# Patient Record
Sex: FEMALE | NOT HISPANIC OR LATINO
[De-identification: names, ages, dates, MRNs, and addresses within clinical notes are randomized per-mention and may not be internally consistent; named-entity substitution may affect disease eponyms.]

---

## 2017-04-17 ENCOUNTER — APPOINTMENT (OUTPATIENT)
Dept: HEMATOLOGY ONCOLOGY | Facility: CLINIC | Age: 67
End: 2017-04-17

## 2017-04-17 VITALS
RESPIRATION RATE: 12 BRPM | SYSTOLIC BLOOD PRESSURE: 136 MMHG | WEIGHT: 132 LBS | HEART RATE: 65 BPM | OXYGEN SATURATION: 99 % | DIASTOLIC BLOOD PRESSURE: 89 MMHG | TEMPERATURE: 97.6 F

## 2017-04-18 LAB
ALBUMIN SERPL ELPH-MCNC: 4.6 G/DL
ALP BLD-CCNC: 64 U/L
ALT SERPL-CCNC: 13 U/L
ANION GAP SERPL CALC-SCNC: 17 MMOL/L
AST SERPL-CCNC: 18 U/L
BILIRUB SERPL-MCNC: 0.5 MG/DL
BUN SERPL-MCNC: 17 MG/DL
CALCIUM SERPL-MCNC: 9.6 MG/DL
CHLORIDE SERPL-SCNC: 101 MMOL/L
CO2 SERPL-SCNC: 26 MMOL/L
CREAT SERPL-MCNC: 0.76 MG/DL
GLUCOSE SERPL-MCNC: 99 MG/DL
LDH SERPL-CCNC: 191 U/L
POTASSIUM SERPL-SCNC: 4.8 MMOL/L
PROT SERPL-MCNC: 6.6 G/DL
SODIUM SERPL-SCNC: 144 MMOL/L
URATE SERPL-MCNC: 3.5 MG/DL

## 2017-04-19 LAB — 25(OH)D3 SERPL-MCNC: 24.5 NG/ML

## 2017-11-28 ENCOUNTER — APPOINTMENT (OUTPATIENT)
Dept: HEMATOLOGY ONCOLOGY | Facility: CLINIC | Age: 67
End: 2017-11-28
Payer: COMMERCIAL

## 2017-11-28 VITALS
SYSTOLIC BLOOD PRESSURE: 122 MMHG | WEIGHT: 132 LBS | RESPIRATION RATE: 12 BRPM | HEART RATE: 69 BPM | TEMPERATURE: 97.8 F | OXYGEN SATURATION: 98 % | DIASTOLIC BLOOD PRESSURE: 70 MMHG

## 2017-11-28 PROCEDURE — 99215 OFFICE O/P EST HI 40 MIN: CPT

## 2017-11-28 PROCEDURE — 85025 COMPLETE CBC W/AUTO DIFF WBC: CPT

## 2017-11-28 RX ORDER — ATORVASTATIN CALCIUM 10 MG/1
10 TABLET, FILM COATED ORAL
Refills: 0 | Status: ACTIVE | COMMUNITY

## 2017-11-28 RX ORDER — CHOLECALCIFEROL (VITAMIN D3) 50 MCG
50 MCG TABLET ORAL
Refills: 0 | Status: ACTIVE | COMMUNITY

## 2017-11-29 LAB
25(OH)D3 SERPL-MCNC: 36.6 NG/ML
LDH SERPL-CCNC: 168 U/L
URATE SERPL-MCNC: 3.8 MG/DL

## 2017-12-11 LAB
ALBUMIN SERPL ELPH-MCNC: 4.5 G/DL
ALP BLD-CCNC: 60 U/L
ALT SERPL-CCNC: 15 U/L
ANION GAP SERPL CALC-SCNC: 13 MMOL/L
AST SERPL-CCNC: 18 U/L
BILIRUB SERPL-MCNC: 0.5 MG/DL
BUN SERPL-MCNC: 15 MG/DL
CALCIUM SERPL-MCNC: 9.7 MG/DL
CHLORIDE SERPL-SCNC: 103 MMOL/L
CO2 SERPL-SCNC: 28 MMOL/L
CREAT SERPL-MCNC: 0.72 MG/DL
GLUCOSE SERPL-MCNC: 98 MG/DL
POTASSIUM SERPL-SCNC: 4.6 MMOL/L
PROT SERPL-MCNC: 6.5 G/DL
SODIUM SERPL-SCNC: 144 MMOL/L

## 2018-08-06 ENCOUNTER — APPOINTMENT (OUTPATIENT)
Dept: HEMATOLOGY ONCOLOGY | Facility: CLINIC | Age: 68
End: 2018-08-06
Payer: COMMERCIAL

## 2018-08-06 VITALS
HEART RATE: 62 BPM | DIASTOLIC BLOOD PRESSURE: 80 MMHG | SYSTOLIC BLOOD PRESSURE: 110 MMHG | WEIGHT: 129 LBS | RESPIRATION RATE: 14 BRPM | OXYGEN SATURATION: 99 %

## 2018-08-06 DIAGNOSIS — E55.9 VITAMIN D DEFICIENCY, UNSPECIFIED: ICD-10-CM

## 2018-08-06 DIAGNOSIS — C91.90 LYMPHOID LEUKEMIA, UNSPECIFIED NOT HAVING ACHIEVED REMISSION: ICD-10-CM

## 2018-08-06 PROCEDURE — 85025 COMPLETE CBC W/AUTO DIFF WBC: CPT

## 2018-08-06 PROCEDURE — 99215 OFFICE O/P EST HI 40 MIN: CPT

## 2018-08-06 RX ORDER — PNV NO.95/FERROUS FUM/FOLIC AC 28MG-0.8MG
TABLET ORAL
Refills: 0 | Status: DISCONTINUED | COMMUNITY
End: 2018-08-06

## 2018-08-07 LAB
25(OH)D3 SERPL-MCNC: 49.1 NG/ML
ALBUMIN SERPL ELPH-MCNC: 4.7 G/DL
ALP BLD-CCNC: 63 U/L
ALT SERPL-CCNC: 12 U/L
ANION GAP SERPL CALC-SCNC: 15 MMOL/L
AST SERPL-CCNC: 18 U/L
BILIRUB SERPL-MCNC: 0.6 MG/DL
BUN SERPL-MCNC: 15 MG/DL
CALCIUM SERPL-MCNC: 9.8 MG/DL
CHLORIDE SERPL-SCNC: 104 MMOL/L
CO2 SERPL-SCNC: 27 MMOL/L
CREAT SERPL-MCNC: 0.75 MG/DL
GLUCOSE SERPL-MCNC: 97 MG/DL
LDH SERPL-CCNC: 184 U/L
POTASSIUM SERPL-SCNC: 4.9 MMOL/L
PROT SERPL-MCNC: 6.7 G/DL
SODIUM SERPL-SCNC: 146 MMOL/L
URATE SERPL-MCNC: 3.8 MG/DL

## 2019-03-04 ENCOUNTER — APPOINTMENT (OUTPATIENT)
Dept: HEMATOLOGY ONCOLOGY | Facility: CLINIC | Age: 69
End: 2019-03-04
Payer: COMMERCIAL

## 2019-03-04 VITALS — WEIGHT: 129 LBS | OXYGEN SATURATION: 99 % | HEART RATE: 68 BPM

## 2019-03-04 PROCEDURE — 99214 OFFICE O/P EST MOD 30 MIN: CPT

## 2019-03-04 PROCEDURE — 85025 COMPLETE CBC W/AUTO DIFF WBC: CPT

## 2019-03-05 NOTE — HISTORY OF PRESENT ILLNESS
[de-identified] : Ms. Gray is a 68 y old female with CLL (diagnosed 2008), treatment-naive, who presents for routine follow-up.\par \par She has no new complaints today. No fevers or drenching night sweats. Good appetite. Weight stable. No chest pain or shortness of breath. Energy excellent. No recent infections. No changes in medications [de-identified] : CLL Markers:\par mutated\par CD38-, ZAP70-\par del 13q [FreeTextEntry1] : Treatment naive

## 2019-03-05 NOTE — PHYSICAL EXAM
[Fully active, able to carry on all pre-disease performance without restriction] : Status 0 - Fully active, able to carry on all pre-disease performance without restriction [Normal] : affect appropriate [de-identified] : EOMI, anicteric sclera, conjunctiva normal [de-identified] : EDNA [de-identified] : no c/c/e

## 2019-03-05 NOTE — ASSESSMENT
[FreeTextEntry1] : Ms. Gray is a 68 year old female with CLL (diagnosed 2008), treatment-naive, who presents for routine follow-up.\par \par Plan:\par 1)CBC reviewed, counts stable\par 2)feels well, no indication for CLL directed therapy, continue to observe\par 3)continue vitamin D supplementation and monitor vitamin D levels\par 4)continue routine health maintenance and age appropriate screening as indicated\par 5)will monitor infections over the next few months\par 6)RTO in 6-8 months, sooner PRN\par \par

## 2019-03-05 NOTE — RESULTS/DATA
[FreeTextEntry1] : CBC done 3/4/19:\par WBC: 20.7\par A.82\par ANC: 2.03\par Hgb: 12.6\par Hct: 37.6\par Plt: 175.0

## 2019-04-01 LAB
25(OH)D3 SERPL-MCNC: 46.7 NG/ML
ALBUMIN SERPL ELPH-MCNC: 4.3 G/DL
ALP BLD-CCNC: 66 U/L
ALT SERPL-CCNC: 14 U/L
ANION GAP SERPL CALC-SCNC: 12 MMOL/L
AST SERPL-CCNC: 19 U/L
BILIRUB SERPL-MCNC: 0.4 MG/DL
BUN SERPL-MCNC: 15 MG/DL
CALCIUM SERPL-MCNC: 9.2 MG/DL
CHLORIDE SERPL-SCNC: 102 MMOL/L
CO2 SERPL-SCNC: 28 MMOL/L
CREAT SERPL-MCNC: 0.71 MG/DL
GLUCOSE SERPL-MCNC: 108 MG/DL
LDH SERPL-CCNC: 157 U/L
POTASSIUM SERPL-SCNC: 3.9 MMOL/L
PROT SERPL-MCNC: 6.3 G/DL
SODIUM SERPL-SCNC: 142 MMOL/L
URATE SERPL-MCNC: 3.6 MG/DL

## 2019-09-09 ENCOUNTER — APPOINTMENT (OUTPATIENT)
Dept: HEMATOLOGY ONCOLOGY | Facility: CLINIC | Age: 69
End: 2019-09-09
Payer: COMMERCIAL

## 2019-09-09 VITALS
DIASTOLIC BLOOD PRESSURE: 80 MMHG | HEART RATE: 68 BPM | OXYGEN SATURATION: 98 % | SYSTOLIC BLOOD PRESSURE: 142 MMHG | WEIGHT: 131 LBS

## 2019-09-09 PROCEDURE — 99215 OFFICE O/P EST HI 40 MIN: CPT

## 2019-09-09 PROCEDURE — 85025 COMPLETE CBC W/AUTO DIFF WBC: CPT

## 2019-09-10 LAB
ALBUMIN SERPL ELPH-MCNC: 4.7 G/DL
ALP BLD-CCNC: 57 U/L
ALT SERPL-CCNC: 13 U/L
ANION GAP SERPL CALC-SCNC: 14 MMOL/L
AST SERPL-CCNC: 18 U/L
BILIRUB SERPL-MCNC: 0.6 MG/DL
BUN SERPL-MCNC: 18 MG/DL
CALCIUM SERPL-MCNC: 9.4 MG/DL
CHLORIDE SERPL-SCNC: 104 MMOL/L
CO2 SERPL-SCNC: 26 MMOL/L
CREAT SERPL-MCNC: 0.81 MG/DL
GLUCOSE SERPL-MCNC: 93 MG/DL
LDH SERPL-CCNC: 192 U/L
POTASSIUM SERPL-SCNC: 5 MMOL/L
PROT SERPL-MCNC: 6.4 G/DL
SODIUM SERPL-SCNC: 143 MMOL/L
URATE SERPL-MCNC: 3.7 MG/DL

## 2019-09-12 NOTE — HISTORY OF PRESENT ILLNESS
[de-identified] : Ms. Gray is a 68 y old female with CLL (diagnosed 2008), treatment-naive, who presents for routine follow-up.\par \par She has no new complaints today.States she feels great. No fevers or drenching night sweats. Good appetite. Weight stable. No chest pain or shortness of breath. Energy excellent. No recent infections. No changes in medications [de-identified] : CLL Markers:\par mutated\par CD38-, ZAP70-\par del 13q [FreeTextEntry1] : Treatment naive

## 2019-09-12 NOTE — RESULTS/DATA
[FreeTextEntry1] : CBC done 19:\par WBC: 38.7\par A.02\par ANC: 5.50\par Hgb: 13.3\par Hct: 40.6\par Plt: 214.0

## 2019-09-12 NOTE — PHYSICAL EXAM
[Fully active, able to carry on all pre-disease performance without restriction] : Status 0 - Fully active, able to carry on all pre-disease performance without restriction [Normal] : affect appropriate [de-identified] : EOMI, anicteric sclera, conjunctiva normal [de-identified] : no c/c/e [de-identified] : EDNA

## 2020-04-07 ENCOUNTER — APPOINTMENT (OUTPATIENT)
Dept: HEMATOLOGY ONCOLOGY | Facility: CLINIC | Age: 70
End: 2020-04-07

## 2020-08-27 ENCOUNTER — APPOINTMENT (OUTPATIENT)
Dept: HEMATOLOGY ONCOLOGY | Facility: CLINIC | Age: 70
End: 2020-08-27
Payer: COMMERCIAL

## 2020-08-27 PROCEDURE — 99213 OFFICE O/P EST LOW 20 MIN: CPT | Mod: 95

## 2020-10-30 NOTE — PHYSICAL EXAM
[Fully active, able to carry on all pre-disease performance without restriction] : Status 0 - Fully active, able to carry on all pre-disease performance without restriction [Normal] : affect appropriate [de-identified] : EOMI, anicteric sclera, conjunctiva normal [de-identified] : EDNA [de-identified] : no c/c/e

## 2020-10-30 NOTE — ASSESSMENT
[FreeTextEntry1] : Ms. Gray is a 68 year old female with CLL (diagnosed 2008), treatment-naive, who presents for routine follow-up.\par \par Plan:\par 1)CBC reviewed, counts stable\par 2)feels well, no indication for CLL directed therapy, continue to observe\par 3)continue vitamin D supplementation and monitor vitamin D levels\par 4)continue routine health maintenance and age appropriate screening as indicated\par 5)RTO in 6-8 months, sooner PRN\par \par

## 2020-10-30 NOTE — RESULTS/DATA
[FreeTextEntry1] : CBC done 8/10/20:\par WBC: 37.6\par A.1\par ANC: 3.7\par Hgb: 12.9\par Hct: 39.5\par Plt: 231

## 2020-10-30 NOTE — HISTORY OF PRESENT ILLNESS
[de-identified] : This visit was provided via telehealth using real-time 2-way audio visual technology. The patient NAMRATA LINCOLN was located at [facility], [address] [place] [address] , at the time of the visit. The provider, TIFFANY STONE, was located at [place], [place] at the time of visit. The patient, NAMRATA LINCOLN and Provider participated in the telehealth encounter.  \par \par Verbal consent given on  8/27/2020 by the patient, NAMRATA LINCOLN.\par \par Ms. Lincoln is a 70 y old female with CLL (diagnosed 2008), treatment-naive, who presents for routine follow-up.\par \par She feels well, has been home bound for 5 months but exercises and takes walks. She has no 'B' symptoms. She saw her internist 4 months ago who felt a few small nodes in her neck.  I looked at her neck bilaterally and feel that if there indeed are any palpable nodes, they are very small and clinically not significant. Her blood count remains very stable. No need for Rx. She asked if she might be able to start going to work after September 8, 2020 but there will be no contact with other people. She will use mask and social distancing. I told her it will be okay.\par  [de-identified] : CLL Markers:\par mutated\par CD38-, ZAP70-\par del 13q [FreeTextEntry1] : Treatment naive

## 2021-02-16 ENCOUNTER — APPOINTMENT (OUTPATIENT)
Dept: HEMATOLOGY ONCOLOGY | Facility: CLINIC | Age: 71
End: 2021-02-16
Payer: COMMERCIAL

## 2021-02-16 PROCEDURE — 99213 OFFICE O/P EST LOW 20 MIN: CPT | Mod: 95

## 2021-03-15 NOTE — ASSESSMENT
[FreeTextEntry1] : Ms. Gray is a 71 year old female with CLL (diagnosed 2008), treatment-naive, who presents for routine follow-up.\par \par Plan:\par 1)CBC reviewed, counts stable\par 2)feels well, no indication for CLL directed therapy, continue to observe\par 3) Let podiatrist do the excision and send me path\par 4)continue routine health maintenance and age appropriate screening as indicated\par 5)RTO in 6 months, sooner PRN\par \par

## 2021-03-15 NOTE — REASON FOR VISIT
[Home] : at home, [unfilled] , at the time of the visit. [Medical Office: ([facility])___] : at the medical office located in  [Verbal consent obtained from patient] : the patient, [unfilled] [Follow-Up Visit] : a follow-up visit for [CLL] : chronic lymphocytic leukemia [Spouse] : spouse

## 2021-03-15 NOTE — PHYSICAL EXAM
[Fully active, able to carry on all pre-disease performance without restriction] : Status 0 - Fully active, able to carry on all pre-disease performance without restriction [Normal] : affect appropriate [de-identified] : EOMI, anicteric sclera, conjunctiva normal [de-identified] : EDNA [de-identified] : no c/c/e

## 2021-03-15 NOTE — RESULTS/DATA
[FreeTextEntry1] : CBC done 2021:\par \par WBC: 45.2\par A.5\par ANC: 3.9\par HGB: 13.0\par HCT: 39.0\par PLT: 230

## 2021-03-15 NOTE — HISTORY OF PRESENT ILLNESS
[de-identified] : This visit was provided via telehealth using real-time 2-way audio visual technology. The patient NAMRATA LINCOLN was located at home, [address] [place] [address] , at the time of the visit. The provider, TIFFANY STONE, was located at [place], [place] at the time of visit. The patient, NAMRATA LINCOLN and Provider participated in the telehealth encounter.  \par \par Verbal consent given on  2/16/2020 by the patient, NAMRATA LINCOLN.\par \par Ms. Lincoln is a 71 y old lady known to have CLL since 2008, treatment-naive. Inspite of the severe restraints imposed by COVID 19 pandemic, Ngoc Ambrose) has remained well and offers no complaints. The only complaint is that few weeks ago, she notice a discolored skin and bump on her foot -- it was not painful, red or inflamed. she saw a podiatrist who couldn't tell what it was but said that this could be removed under local anesthesia.. I asked her to show me the foot through her phone camera and I could see a 1 cm wide discolored lesion -- it did not look like a gritty lesion. All her past uric acid tests have been 3.6% or thereabouts. \par \par  [de-identified] : CLL Markers:\par mutated\par CD38-, ZAP70-\par del 13q [FreeTextEntry1] : Treatment naive

## 2021-08-03 ENCOUNTER — APPOINTMENT (OUTPATIENT)
Dept: HEMATOLOGY ONCOLOGY | Facility: CLINIC | Age: 71
End: 2021-08-03
Payer: COMMERCIAL

## 2021-08-03 VITALS
OXYGEN SATURATION: 98 % | RESPIRATION RATE: 14 BRPM | WEIGHT: 130 LBS | TEMPERATURE: 98.5 F | DIASTOLIC BLOOD PRESSURE: 72 MMHG | SYSTOLIC BLOOD PRESSURE: 110 MMHG | HEART RATE: 60 BPM

## 2021-08-03 PROCEDURE — 99214 OFFICE O/P EST MOD 30 MIN: CPT

## 2021-08-03 PROCEDURE — 85025 COMPLETE CBC W/AUTO DIFF WBC: CPT

## 2021-08-04 LAB
ALBUMIN SERPL ELPH-MCNC: 4.5 G/DL
ALP BLD-CCNC: 67 U/L
ALT SERPL-CCNC: 11 U/L
ANION GAP SERPL CALC-SCNC: 10 MMOL/L
AST SERPL-CCNC: 17 U/L
BILIRUB SERPL-MCNC: 0.6 MG/DL
BUN SERPL-MCNC: 14 MG/DL
CALCIUM SERPL-MCNC: 9.9 MG/DL
CHLORIDE SERPL-SCNC: 104 MMOL/L
CO2 SERPL-SCNC: 27 MMOL/L
COVID-19 SPIKE DOMAIN ANTIBODY INTERPRETATION: POSITIVE
CREAT SERPL-MCNC: 0.71 MG/DL
GLUCOSE SERPL-MCNC: 98 MG/DL
LDH SERPL-CCNC: 175 U/L
MAGNESIUM SERPL-MCNC: 2.2 MG/DL
PHOSPHATE SERPL-MCNC: 3.6 MG/DL
POTASSIUM SERPL-SCNC: 4.6 MMOL/L
PROT SERPL-MCNC: 6.2 G/DL
SARS-COV-2 AB SERPL IA-ACNC: >250 U/ML
SODIUM SERPL-SCNC: 141 MMOL/L
URATE SERPL-MCNC: 3.4 MG/DL

## 2021-08-13 NOTE — HISTORY OF PRESENT ILLNESS
[de-identified] : Ms. Gray is a 71 year old female with CLL (diagnosed 2008), treatment-naive, who presents for routine follow-up.\par \par She feels well and has no complaints. She continues to maintain an active lifestyle. She is an avid runner and swimmer. Recently she has been getting cortisone injection to the knee due joint pains. The cortisone injections have been helping as per patient.  She has no fevers/chills. No drenching night sweats. No CP. No SOB. Appetite is good. Weight stable. No bothersome lymph nodes. No abd pain. No GI/ issues. No recent/recurrent infections. No new meds.  [de-identified] : CLL Markers:\par mutated\par CD38-, ZAP70-\par del 13q [FreeTextEntry1] : Treatment naive

## 2021-08-13 NOTE — PHYSICAL EXAM
[Fully active, able to carry on all pre-disease performance without restriction] : Status 0 - Fully active, able to carry on all pre-disease performance without restriction [Normal] : affect appropriate [de-identified] : EOMI, anicteric sclera, conjunctiva normal [de-identified] : EDNA [de-identified] : no c/c/e

## 2021-08-13 NOTE — ASSESSMENT
[FreeTextEntry1] : Ms. Gray is a 71 year old female with CLL (diagnosed 2008), treatment-naive, who presents for routine follow-up.\par \par Plan:\par 1)CBC reviewed, counts stable\par 2)feels well, no indication for CLL directed therapy, continue to observe\par 3)continue routine health maintenance and age appropriate screening as indicated\par 4)RTO in 6 months, sooner PRN\par \par

## 2022-02-17 ENCOUNTER — APPOINTMENT (OUTPATIENT)
Dept: HEMATOLOGY ONCOLOGY | Facility: CLINIC | Age: 72
End: 2022-02-17
Payer: COMMERCIAL

## 2022-02-17 PROCEDURE — 99213 OFFICE O/P EST LOW 20 MIN: CPT | Mod: 95

## 2022-02-17 NOTE — HISTORY OF PRESENT ILLNESS
[de-identified] : Ms. Gray is a 72 year old female with CLL (diagnosed 2008), treatment-naive, who presents for routine follow-up.\par \par She feels well and has no complaints. She continues to maintain an active lifestyle. She is an avid runner and swimmer. She has no fevers/chills. No drenching night sweats. No CP. No SOB. Appetite is good. Weight stable. No bothersome lymph nodes. No abd pain. No GI/ issues. No recent/recurrent infections. No new meds. Pt has had 3 doses of the COVID vaccine - last 11/27/21 [de-identified] : CLL Markers:\par mutated\par CD38-, ZAP70-\par del 13q [FreeTextEntry1] : Treatment naive

## 2022-02-17 NOTE — RESULTS/DATA
[FreeTextEntry1] : CBC done  22\par WBC: 48.6\par A.71\par ANC: 10.2\par Hgb: 12.8\par Hct: 39.2\par Plt: 247

## 2022-02-17 NOTE — PHYSICAL EXAM
[Fully active, able to carry on all pre-disease performance without restriction] : Status 0 - Fully active, able to carry on all pre-disease performance without restriction [Normal] : affect appropriate [de-identified] : AAOX3

## 2022-02-17 NOTE — ASSESSMENT
[FreeTextEntry1] : Ms. Gray is a 72 year old female with CLL (diagnosed 2008), treatment-naive, who presents for routine follow-up.\par \par Plan:\par 1)CBC reviewed, counts stable\par -Pt given info for Kash - she will read the information and let us know if she's intersted \par 2)feels well, no indication for CLL directed therapy, continue to observe\par 3)continue routine health maintenance and age appropriate screening as indicated\par 4)RTO in 6 months, sooner PRN\par \par

## 2022-03-05 ENCOUNTER — OUTPATIENT (OUTPATIENT)
Dept: OUTPATIENT SERVICES | Facility: HOSPITAL | Age: 72
LOS: 1 days | End: 2022-03-05

## 2022-03-05 ENCOUNTER — APPOINTMENT (OUTPATIENT)
Age: 72
End: 2022-03-05

## 2022-03-05 VITALS
HEART RATE: 18 BPM | RESPIRATION RATE: 18 BRPM | TEMPERATURE: 98 F | SYSTOLIC BLOOD PRESSURE: 129 MMHG | DIASTOLIC BLOOD PRESSURE: 78 MMHG | OXYGEN SATURATION: 97 %

## 2022-03-05 VITALS
RESPIRATION RATE: 18 BRPM | TEMPERATURE: 98 F | OXYGEN SATURATION: 100 % | HEART RATE: 18 BPM | SYSTOLIC BLOOD PRESSURE: 169 MMHG | DIASTOLIC BLOOD PRESSURE: 85 MMHG

## 2022-03-05 DIAGNOSIS — D64.9 ANEMIA, UNSPECIFIED: ICD-10-CM

## 2022-03-07 DIAGNOSIS — D50.9 IRON DEFICIENCY ANEMIA, UNSPECIFIED: ICD-10-CM

## 2022-03-07 DIAGNOSIS — Z23 ENCOUNTER FOR IMMUNIZATION: ICD-10-CM

## 2022-06-14 ENCOUNTER — NON-APPOINTMENT (OUTPATIENT)
Age: 72
End: 2022-06-14

## 2022-09-17 ENCOUNTER — OUTPATIENT (OUTPATIENT)
Dept: OUTPATIENT SERVICES | Facility: HOSPITAL | Age: 72
LOS: 1 days | End: 2022-09-17

## 2022-09-17 ENCOUNTER — APPOINTMENT (OUTPATIENT)
Age: 72
End: 2022-09-17

## 2022-09-17 VITALS
RESPIRATION RATE: 16 BRPM | SYSTOLIC BLOOD PRESSURE: 129 MMHG | DIASTOLIC BLOOD PRESSURE: 75 MMHG | HEART RATE: 58 BPM | TEMPERATURE: 98 F | OXYGEN SATURATION: 98 %

## 2022-09-17 VITALS
DIASTOLIC BLOOD PRESSURE: 77 MMHG | OXYGEN SATURATION: 99 % | SYSTOLIC BLOOD PRESSURE: 134 MMHG | HEART RATE: 49 BPM | TEMPERATURE: 98 F | RESPIRATION RATE: 16 BRPM

## 2022-09-17 DIAGNOSIS — C91.10 CHRONIC LYMPHOCYTIC LEUKEMIA OF B-CELL TYPE NOT HAVING ACHIEVED REMISSION: ICD-10-CM

## 2022-09-17 DIAGNOSIS — Z23 ENCOUNTER FOR IMMUNIZATION: ICD-10-CM

## 2022-09-23 ENCOUNTER — NON-APPOINTMENT (OUTPATIENT)
Age: 72
End: 2022-09-23

## 2022-09-23 ENCOUNTER — APPOINTMENT (OUTPATIENT)
Dept: HEMATOLOGY ONCOLOGY | Facility: CLINIC | Age: 72
End: 2022-09-23

## 2022-09-23 VITALS
HEART RATE: 56 BPM | SYSTOLIC BLOOD PRESSURE: 110 MMHG | DIASTOLIC BLOOD PRESSURE: 70 MMHG | TEMPERATURE: 97.5 F | WEIGHT: 128 LBS | OXYGEN SATURATION: 99 %

## 2022-09-23 PROCEDURE — 85025 COMPLETE CBC W/AUTO DIFF WBC: CPT

## 2022-09-23 PROCEDURE — 99213 OFFICE O/P EST LOW 20 MIN: CPT

## 2022-09-26 LAB
ALBUMIN SERPL ELPH-MCNC: 4.6 G/DL
ALP BLD-CCNC: 77 U/L
ALT SERPL-CCNC: 13 U/L
ANION GAP SERPL CALC-SCNC: 10 MMOL/L
AST SERPL-CCNC: 14 U/L
BILIRUB SERPL-MCNC: 0.7 MG/DL
BUN SERPL-MCNC: 16 MG/DL
CALCIUM SERPL-MCNC: 9.7 MG/DL
CHLORIDE SERPL-SCNC: 103 MMOL/L
CO2 SERPL-SCNC: 28 MMOL/L
CREAT SERPL-MCNC: 0.69 MG/DL
EGFR: 93 ML/MIN/1.73M2
GLUCOSE SERPL-MCNC: 96 MG/DL
LDH SERPL-CCNC: 159 U/L
MAGNESIUM SERPL-MCNC: 2.2 MG/DL
PHOSPHATE SERPL-MCNC: 3.4 MG/DL
POTASSIUM SERPL-SCNC: 4.5 MMOL/L
PROT SERPL-MCNC: 6.1 G/DL
SODIUM SERPL-SCNC: 140 MMOL/L
URATE SERPL-MCNC: 3.4 MG/DL

## 2022-10-05 NOTE — PHYSICAL EXAM
[Fully active, able to carry on all pre-disease performance without restriction] : Status 0 - Fully active, able to carry on all pre-disease performance without restriction [Normal] : normal appearance, no rash, nodules, vesicles, ulcers, erythema [de-identified] : AAOX3

## 2022-10-05 NOTE — RESULTS/DATA
[FreeTextEntry1] : CBC done  22:\par WBC: 49.0\par A.04\par ANC: 5.00\par Hgb: 13.4\par Hct: 40.3\par Plt:237.0

## 2022-10-05 NOTE — ASSESSMENT
[FreeTextEntry1] : Ms. Gray is a 72 year old female with CLL (diagnosed 2008), treatment-naive, who presents for routine follow-up.\par \par Plan:\par CLL (dx 2008)\par 1)CBC reviewed, counts stable\par -Recieved Evusheld -received 3/5/22 and 9/17/22\par 2)feels well, no indication for CLL directed therapy, continue to observe\par 3)continue routine health maintenance and age appropriate screening as indicated\par 4)RTO in 6 months, sooner PRN\par \par

## 2022-10-05 NOTE — HISTORY OF PRESENT ILLNESS
[de-identified] : Ms. Gray is a 72 year old female with CLL (diagnosed 2008), treatment-naive, who presents for routine follow-up.\par \par  [de-identified] : CLL Markers:\par mutated\par CD38-, ZAP70-\par del 13q [FreeTextEntry1] : Treatment naive [de-identified] : She feels well and has no complaints. She continues to maintain an active lifestyle. She is an avid runner and swimmer. She has no fevers/chills. No drenching night sweats. No CP. No SOB. Appetite is good. Weight stable. No bothersome lymph nodes. No abd pain. No GI/ issues. No recent/recurrent infections. No new meds. Pt has had 3 doses of the COVID vaccine and Kash -received 3/5/22 and 9/17/22.

## 2023-03-23 ENCOUNTER — OUTPATIENT (OUTPATIENT)
Dept: OUTPATIENT SERVICES | Facility: HOSPITAL | Age: 73
LOS: 1 days | Discharge: HOME | End: 2023-03-23

## 2023-03-23 DIAGNOSIS — D64.9 ANEMIA, UNSPECIFIED: ICD-10-CM

## 2023-03-24 ENCOUNTER — RESULT REVIEW (OUTPATIENT)
Age: 73
End: 2023-03-24

## 2023-03-24 ENCOUNTER — APPOINTMENT (OUTPATIENT)
Dept: HEMATOLOGY ONCOLOGY | Facility: CLINIC | Age: 73
End: 2023-03-24
Payer: COMMERCIAL

## 2023-03-24 ENCOUNTER — TRANSCRIPTION ENCOUNTER (OUTPATIENT)
Age: 73
End: 2023-03-24

## 2023-03-24 VITALS
WEIGHT: 128.53 LBS | SYSTOLIC BLOOD PRESSURE: 154 MMHG | HEART RATE: 62 BPM | DIASTOLIC BLOOD PRESSURE: 85 MMHG | HEIGHT: 66.42 IN | BODY MASS INDEX: 20.41 KG/M2 | OXYGEN SATURATION: 99 % | TEMPERATURE: 96.8 F | RESPIRATION RATE: 15 BRPM

## 2023-03-24 LAB
BASOPHILS # BLD AUTO: 0 K/UL — SIGNIFICANT CHANGE UP (ref 0–0.2)
BASOPHILS NFR BLD AUTO: 0 % — SIGNIFICANT CHANGE UP (ref 0–2)
EOSINOPHIL # BLD AUTO: 0.58 K/UL — HIGH (ref 0–0.5)
EOSINOPHIL NFR BLD AUTO: 1 % — SIGNIFICANT CHANGE UP (ref 0–6)
HCT VFR BLD CALC: 40.4 % — SIGNIFICANT CHANGE UP (ref 34.5–45)
HGB BLD-MCNC: 12.9 G/DL — SIGNIFICANT CHANGE UP (ref 11.5–15.5)
LDH SERPL-CCNC: 177 U/L
LYMPHOCYTES # BLD AUTO: 44.22 K/UL — HIGH (ref 1–3.3)
LYMPHOCYTES # BLD AUTO: 76 % — HIGH (ref 13–44)
LYMPHOCYTES # SPEC AUTO: 14 % — HIGH (ref 0–0)
MAGNESIUM SERPL-MCNC: 2.1 MG/DL
MCHC RBC-ENTMCNC: 31 PG — SIGNIFICANT CHANGE UP (ref 27–34)
MCHC RBC-ENTMCNC: 31.9 G/DL — LOW (ref 32–36)
MCV RBC AUTO: 97.1 FL — SIGNIFICANT CHANGE UP (ref 80–100)
MONOCYTES # BLD AUTO: 0.58 K/UL — SIGNIFICANT CHANGE UP (ref 0–0.9)
MONOCYTES NFR BLD AUTO: 1 % — LOW (ref 2–14)
NEUTROPHILS # BLD AUTO: 4.65 K/UL — SIGNIFICANT CHANGE UP (ref 1.8–7.4)
NEUTROPHILS NFR BLD AUTO: 8 % — LOW (ref 43–77)
NRBC # BLD: 0 /100 — SIGNIFICANT CHANGE UP (ref 0–0)
NRBC # BLD: SIGNIFICANT CHANGE UP /100 WBCS (ref 0–0)
PHOSPHATE SERPL-MCNC: 3.9 MG/DL
PLAT MORPH BLD: NORMAL — SIGNIFICANT CHANGE UP
PLATELET # BLD AUTO: 240 K/UL — SIGNIFICANT CHANGE UP (ref 150–400)
RBC # BLD: 4.16 M/UL — SIGNIFICANT CHANGE UP (ref 3.8–5.2)
RBC # FLD: 13.1 % — SIGNIFICANT CHANGE UP (ref 10.3–14.5)
RBC BLD AUTO: SIGNIFICANT CHANGE UP
SMUDGE CELLS # BLD: PRESENT — SIGNIFICANT CHANGE UP
URATE SERPL-MCNC: 3.7 MG/DL
WBC # BLD: 58.18 K/UL — CRITICAL HIGH (ref 3.8–10.5)
WBC # FLD AUTO: 58.18 K/UL — CRITICAL HIGH (ref 3.8–10.5)

## 2023-03-24 PROCEDURE — 99072 ADDL SUPL MATRL&STAF TM PHE: CPT

## 2023-03-24 PROCEDURE — 99213 OFFICE O/P EST LOW 20 MIN: CPT

## 2023-03-24 NOTE — HISTORY OF PRESENT ILLNESS
[de-identified] : Ms. Gray is a 73 year old female with CLL (diagnosed 2008), treatment-naive, who presents for routine follow-up.\par \par  [de-identified] : CLL Markers:\par mutated\par CD38-, ZAP70-\par del 13q [FreeTextEntry1] : Treatment naive [de-identified] : She feels well and has no complaints. She continues to maintain an active lifestyle. She is an avid runner and swimmer (swims almost daily). She has no fevers/chills. No drenching night sweats. No CP. No SOB. Appetite is good. Weight stable. No bothersome lymph nodes. No abd pain. No GI/ issues. No recent/recurrent infections. No new meds. Pt has had 3 doses of the COVID vaccine and Evusheld -received 3/5/22 and 9/17/22. Pt had lesion removed from right arm by derm - as per pt it was benign

## 2023-03-24 NOTE — ASSESSMENT
[FreeTextEntry1] : Ms. Gray is a 73 year old female with CLL (diagnosed 2008), treatment-naive, who presents for routine follow-up.\par \par Plan:\par CLL (dx 2008)\par 1)CBC reviewed, counts stable\par -Recieved Evusheld -received 3/5/22 and 9/17/22\par 2)feels well, no indication for CLL directed therapy, continue to observe\par 3)continue routine health maintenance and age appropriate screening as indicated; pt's last colonoscopy was about ~4 years ago, she is up to date on yearly mammo, pap smear. She has had the flu vaccine this season. Shingles, Pneumonia vaccinations are up to date\par 4)RTO in 6 months, sooner PRN\par \par

## 2023-03-24 NOTE — PHYSICAL EXAM
[Fully active, able to carry on all pre-disease performance without restriction] : Status 0 - Fully active, able to carry on all pre-disease performance without restriction [Normal] : affect appropriate [de-identified] : AAOX3

## 2023-03-24 NOTE — RESULTS/DATA
[FreeTextEntry1] : CBC done  3/24/23\par WBC: 58.18\par A.22\par ANC: 4.65\par Hgb: 12.9\par Hct: 40.4\par Plt:240

## 2023-03-28 LAB — MANUAL DIF COMMENT BLD-IMP: SIGNIFICANT CHANGE UP

## 2023-07-13 LAB
ALBUMIN SERPL ELPH-MCNC: 4.6 G/DL
ALP BLD-CCNC: 71 U/L
ALT SERPL-CCNC: 14 U/L
ANION GAP SERPL CALC-SCNC: 10 MMOL/L
AST SERPL-CCNC: 18 U/L
BILIRUB SERPL-MCNC: 0.6 MG/DL
BUN SERPL-MCNC: 19 MG/DL
CALCIUM SERPL-MCNC: 9.5 MG/DL
CHLORIDE SERPL-SCNC: 103 MMOL/L
CO2 SERPL-SCNC: 28 MMOL/L
CREAT SERPL-MCNC: 0.7 MG/DL
EGFR: 92 ML/MIN/1.73M2
GLUCOSE SERPL-MCNC: 83 MG/DL
POTASSIUM SERPL-SCNC: 4.9 MMOL/L
PROT SERPL-MCNC: 6.1 G/DL
SODIUM SERPL-SCNC: 141 MMOL/L

## 2023-09-21 ENCOUNTER — OUTPATIENT (OUTPATIENT)
Dept: OUTPATIENT SERVICES | Facility: HOSPITAL | Age: 73
LOS: 1 days | Discharge: HOME | End: 2023-09-21

## 2023-09-21 DIAGNOSIS — D64.9 ANEMIA, UNSPECIFIED: ICD-10-CM

## 2023-09-22 ENCOUNTER — APPOINTMENT (OUTPATIENT)
Dept: HEMATOLOGY ONCOLOGY | Facility: CLINIC | Age: 73
End: 2023-09-22
Payer: COMMERCIAL

## 2023-09-22 ENCOUNTER — RESULT REVIEW (OUTPATIENT)
Age: 73
End: 2023-09-22

## 2023-09-22 VITALS
RESPIRATION RATE: 16 BRPM | OXYGEN SATURATION: 99 % | TEMPERATURE: 96.6 F | HEART RATE: 57 BPM | SYSTOLIC BLOOD PRESSURE: 151 MMHG | WEIGHT: 126.52 LBS | DIASTOLIC BLOOD PRESSURE: 87 MMHG | BODY MASS INDEX: 20.17 KG/M2

## 2023-09-22 LAB
BASOPHILS # BLD AUTO: 0.25 K/UL — HIGH (ref 0–0.2)
BASOPHILS NFR BLD AUTO: 0.5 % — SIGNIFICANT CHANGE UP (ref 0–2)
EOSINOPHIL # BLD AUTO: 0.25 K/UL — SIGNIFICANT CHANGE UP (ref 0–0.5)
EOSINOPHIL NFR BLD AUTO: 0.5 % — SIGNIFICANT CHANGE UP (ref 0–6)
HCT VFR BLD CALC: 40.5 % — SIGNIFICANT CHANGE UP (ref 34.5–45)
HGB BLD-MCNC: 12.7 G/DL — SIGNIFICANT CHANGE UP (ref 11.5–15.5)
LYMPHOCYTES # BLD AUTO: 43.51 K/UL — HIGH (ref 1–3.3)
LYMPHOCYTES # BLD AUTO: 87 % — HIGH (ref 13–44)
LYMPHOCYTES # SPEC AUTO: 3.5 % — HIGH (ref 0–0)
MCHC RBC-ENTMCNC: 30.5 PG — SIGNIFICANT CHANGE UP (ref 27–34)
MCHC RBC-ENTMCNC: 31.4 G/DL — LOW (ref 32–36)
MCV RBC AUTO: 97.1 FL — SIGNIFICANT CHANGE UP (ref 80–100)
MONOCYTES # BLD AUTO: 0.5 K/UL — SIGNIFICANT CHANGE UP (ref 0–0.9)
MONOCYTES NFR BLD AUTO: 1 % — LOW (ref 2–14)
NEUTROPHILS # BLD AUTO: 3.75 K/UL — SIGNIFICANT CHANGE UP (ref 1.8–7.4)
NEUTROPHILS NFR BLD AUTO: 7.5 % — LOW (ref 43–77)
NRBC # BLD: 0 /100 — SIGNIFICANT CHANGE UP (ref 0–0)
NRBC # BLD: SIGNIFICANT CHANGE UP /100 WBCS (ref 0–0)
PLAT MORPH BLD: NORMAL — SIGNIFICANT CHANGE UP
PLATELET # BLD AUTO: 246 K/UL — SIGNIFICANT CHANGE UP (ref 150–400)
RBC # BLD: 4.17 M/UL — SIGNIFICANT CHANGE UP (ref 3.8–5.2)
RBC # FLD: 12.8 % — SIGNIFICANT CHANGE UP (ref 10.3–14.5)
RBC BLD AUTO: SIGNIFICANT CHANGE UP
SMUDGE CELLS # BLD: PRESENT — SIGNIFICANT CHANGE UP
WBC # BLD: 50.01 K/UL — CRITICAL HIGH (ref 3.8–10.5)
WBC # FLD AUTO: 50.01 K/UL — CRITICAL HIGH (ref 3.8–10.5)

## 2023-09-22 PROCEDURE — 99213 OFFICE O/P EST LOW 20 MIN: CPT

## 2023-09-25 LAB
ALBUMIN SERPL ELPH-MCNC: 4.5 G/DL
ALP BLD-CCNC: 71 U/L
ALT SERPL-CCNC: 13 U/L
ANION GAP SERPL CALC-SCNC: 10 MMOL/L
AST SERPL-CCNC: 16 U/L
BILIRUB SERPL-MCNC: 0.8 MG/DL
BUN SERPL-MCNC: 19 MG/DL
CALCIUM SERPL-MCNC: 10 MG/DL
CHLORIDE SERPL-SCNC: 104 MMOL/L
CO2 SERPL-SCNC: 27 MMOL/L
CREAT SERPL-MCNC: 0.72 MG/DL
EGFR: 89 ML/MIN/1.73M2
GLUCOSE SERPL-MCNC: 124 MG/DL
LDH SERPL-CCNC: 183 U/L
POTASSIUM SERPL-SCNC: 4.7 MMOL/L
PROT SERPL-MCNC: 6.2 G/DL
SODIUM SERPL-SCNC: 141 MMOL/L
URATE SERPL-MCNC: 3.8 MG/DL

## 2024-03-25 ENCOUNTER — OUTPATIENT (OUTPATIENT)
Dept: OUTPATIENT SERVICES | Facility: HOSPITAL | Age: 74
LOS: 1 days | Discharge: HOME | End: 2024-03-25

## 2024-03-25 DIAGNOSIS — D64.9 ANEMIA, UNSPECIFIED: ICD-10-CM

## 2024-03-29 ENCOUNTER — RESULT REVIEW (OUTPATIENT)
Age: 74
End: 2024-03-29

## 2024-03-29 ENCOUNTER — APPOINTMENT (OUTPATIENT)
Dept: HEMATOLOGY ONCOLOGY | Facility: CLINIC | Age: 74
End: 2024-03-29
Payer: MEDICARE

## 2024-03-29 VITALS
OXYGEN SATURATION: 96 % | DIASTOLIC BLOOD PRESSURE: 86 MMHG | RESPIRATION RATE: 16 BRPM | BODY MASS INDEX: 20.41 KG/M2 | WEIGHT: 128.09 LBS | TEMPERATURE: 98.1 F | HEART RATE: 56 BPM | SYSTOLIC BLOOD PRESSURE: 145 MMHG

## 2024-03-29 LAB
ALBUMIN SERPL ELPH-MCNC: 4.7 G/DL
ALP BLD-CCNC: 62 U/L
ALT SERPL-CCNC: 15 U/L
ANION GAP SERPL CALC-SCNC: 10 MMOL/L
AST SERPL-CCNC: 18 U/L
BASOPHILS # BLD AUTO: 0 K/UL — SIGNIFICANT CHANGE UP (ref 0–0.2)
BASOPHILS NFR BLD AUTO: 0 % — SIGNIFICANT CHANGE UP (ref 0–2)
BILIRUB SERPL-MCNC: 0.6 MG/DL
BUN SERPL-MCNC: 16 MG/DL
CALCIUM SERPL-MCNC: 9.8 MG/DL
CHLORIDE SERPL-SCNC: 101 MMOL/L
CO2 SERPL-SCNC: 29 MMOL/L
CREAT SERPL-MCNC: 0.74 MG/DL
EGFR: 86 ML/MIN/1.73M2
EOSINOPHIL # BLD AUTO: 0 K/UL — SIGNIFICANT CHANGE UP (ref 0–0.5)
EOSINOPHIL NFR BLD AUTO: 0 % — SIGNIFICANT CHANGE UP (ref 0–6)
GLUCOSE SERPL-MCNC: 95 MG/DL
HCT VFR BLD CALC: 41.1 % — SIGNIFICANT CHANGE UP (ref 34.5–45)
HGB BLD-MCNC: 13.4 G/DL — SIGNIFICANT CHANGE UP (ref 11.5–15.5)
LDH SERPL-CCNC: 176 U/L
LYMPHOCYTES # BLD AUTO: 45.43 K/UL — HIGH (ref 1–3.3)
LYMPHOCYTES # BLD AUTO: 90 % — HIGH (ref 13–44)
LYMPHOCYTES # SPEC AUTO: 2 % — HIGH (ref 0–0)
MCHC RBC-ENTMCNC: 31.2 PG — SIGNIFICANT CHANGE UP (ref 27–34)
MCHC RBC-ENTMCNC: 32.6 G/DL — SIGNIFICANT CHANGE UP (ref 32–36)
MCV RBC AUTO: 95.8 FL — SIGNIFICANT CHANGE UP (ref 80–100)
MONOCYTES # BLD AUTO: 1.01 K/UL — HIGH (ref 0–0.9)
MONOCYTES NFR BLD AUTO: 2 % — SIGNIFICANT CHANGE UP (ref 2–14)
NEUTROPHILS # BLD AUTO: 3.03 K/UL — SIGNIFICANT CHANGE UP (ref 1.8–7.4)
NEUTROPHILS NFR BLD AUTO: 6 % — LOW (ref 43–77)
NRBC # BLD: 0 /100 WBCS — SIGNIFICANT CHANGE UP (ref 0–0)
NRBC # BLD: SIGNIFICANT CHANGE UP /100 WBCS (ref 0–0)
PLAT MORPH BLD: NORMAL — SIGNIFICANT CHANGE UP
PLATELET # BLD AUTO: 229 K/UL — SIGNIFICANT CHANGE UP (ref 150–400)
POTASSIUM SERPL-SCNC: 4.7 MMOL/L
PROT SERPL-MCNC: 6.1 G/DL
RBC # BLD: 4.29 M/UL — SIGNIFICANT CHANGE UP (ref 3.8–5.2)
RBC # FLD: 12.9 % — SIGNIFICANT CHANGE UP (ref 10.3–14.5)
RBC BLD AUTO: SIGNIFICANT CHANGE UP
SMUDGE CELLS # BLD: PRESENT — SIGNIFICANT CHANGE UP
SODIUM SERPL-SCNC: 140 MMOL/L
URATE SERPL-MCNC: 3.5 MG/DL
WBC # BLD: 50.48 K/UL — CRITICAL HIGH (ref 3.8–10.5)
WBC # FLD AUTO: 50.48 K/UL — CRITICAL HIGH (ref 3.8–10.5)

## 2024-03-29 PROCEDURE — 99214 OFFICE O/P EST MOD 30 MIN: CPT

## 2024-03-29 NOTE — HISTORY OF PRESENT ILLNESS
[de-identified] : Ms. Gray is a 74 year old female with CLL (diagnosed 2008), treatment-naive, who presents for routine follow-up.  She feels well and has no complaints. She continues to maintain an active lifestyle. She is an avid runner and swimmer (swims almost daily). SHe is scheduled to compete in a swimming event  in [place]. She reports recent injury to L. Shoulder for which she is currently receiving PT. She had COVID in September 2023 but no infections since.  Her case was mild but she did take paxlovid. She has no fevers/chills. No drenching night sweats. No CP. No SOB. Appetite is good. Weight stable. No new or worsening lymphadenopathy. No abd pain. No GI/ issues. No recent/recurrent infections. No new meds. SHe is now retired.  She is UTD on all her age appropriate screenings  [de-identified] : CLL Markers:\par  mutated\par  CD38-, ZAP70-\par  del 13q [FreeTextEntry1] : Treatment naive [ECOG Performance Status: 0 - Fully active, able to carry on all pre-disease performance without restriction] : Performance Status: 0 - Fully active, able to carry on all pre-disease performance without restriction

## 2024-03-29 NOTE — PHYSICAL EXAM
[Fully active, able to carry on all pre-disease performance without restriction] : Status 0 - Fully active, able to carry on all pre-disease performance without restriction [Normal] : affect appropriate [de-identified] : AAOX3

## 2024-03-29 NOTE — ASSESSMENT
[FreeTextEntry1] : Ms. Gray is a 74 year old female with CLL (diagnosed 2008), treatment-naive, who presents for routine follow-up.  Plan: CLL  -CBC reviewed, WBC 50.48 HGB 13.4  ANC 3.03 ALC 45.43  -CMP, LDH and Uric acid sent today. Will f/u results.  -No indication for disease directed treatment. Will continue to monitor -Recommend continued COVID boosters, Annual Flu shots -continue routine health maintenance and age appropriate screening as indicated.  Shingles, Pneumonia vaccinations are up to date -No contraindication to steroid injection in shoulder if PT is ineffective.  RTO in 6 months, sooner PRN

## 2024-09-16 ENCOUNTER — OUTPATIENT (OUTPATIENT)
Dept: OUTPATIENT SERVICES | Facility: HOSPITAL | Age: 74
LOS: 1 days | Discharge: HOME | End: 2024-09-16

## 2024-09-16 DIAGNOSIS — D64.9 ANEMIA, UNSPECIFIED: ICD-10-CM

## 2024-09-25 ENCOUNTER — RESULT REVIEW (OUTPATIENT)
Age: 74
End: 2024-09-25

## 2024-09-25 ENCOUNTER — APPOINTMENT (OUTPATIENT)
Dept: HEMATOLOGY ONCOLOGY | Facility: CLINIC | Age: 74
End: 2024-09-25
Payer: MEDICARE

## 2024-09-25 VITALS
DIASTOLIC BLOOD PRESSURE: 97 MMHG | SYSTOLIC BLOOD PRESSURE: 178 MMHG | WEIGHT: 125.2 LBS | BODY MASS INDEX: 19.95 KG/M2 | RESPIRATION RATE: 16 BRPM | OXYGEN SATURATION: 98 % | HEART RATE: 48 BPM | TEMPERATURE: 97.8 F

## 2024-09-25 LAB
ALBUMIN SERPL ELPH-MCNC: 4.4 G/DL
ALP BLD-CCNC: 64 U/L
ALT SERPL-CCNC: 14 U/L
ANION GAP SERPL CALC-SCNC: 12 MMOL/L
AST SERPL-CCNC: 20 U/L
BASOPHILS # BLD AUTO: 0 K/UL — SIGNIFICANT CHANGE UP (ref 0–0.2)
BASOPHILS NFR BLD AUTO: 0 % — SIGNIFICANT CHANGE UP (ref 0–2)
BILIRUB SERPL-MCNC: 0.8 MG/DL
BUN SERPL-MCNC: 17 MG/DL
CALCIUM SERPL-MCNC: 9.8 MG/DL
CHLORIDE SERPL-SCNC: 102 MMOL/L
CO2 SERPL-SCNC: 26 MMOL/L
CREAT SERPL-MCNC: 0.7 MG/DL
DEPRECATED KAPPA LC FREE/LAMBDA SER: 1.31 RATIO
EGFR: 92 ML/MIN/1.73M2
EOSINOPHIL # BLD AUTO: 0.6 K/UL — HIGH (ref 0–0.5)
EOSINOPHIL NFR BLD AUTO: 1 % — SIGNIFICANT CHANGE UP (ref 0–6)
GLUCOSE SERPL-MCNC: 95 MG/DL
HCT VFR BLD CALC: 39.8 % — SIGNIFICANT CHANGE UP (ref 34.5–45)
HGB BLD-MCNC: 12.5 G/DL — SIGNIFICANT CHANGE UP (ref 11.5–15.5)
IGA SER QL IEP: 50 MG/DL
IGG SER QL IEP: 214 MG/DL
IGM SER QL IEP: 21 MG/DL
KAPPA LC CSF-MCNC: 0.54 MG/DL
KAPPA LC SERPL-MCNC: 0.71 MG/DL
LDH SERPL-CCNC: 190 U/L
LYMPHOCYTES # BLD AUTO: 52.75 K/UL — HIGH (ref 1–3.3)
LYMPHOCYTES # BLD AUTO: 88 % — HIGH (ref 13–44)
LYMPHOCYTES # SPEC AUTO: 5 % — HIGH (ref 0–0)
MCHC RBC-ENTMCNC: 31.1 PG — SIGNIFICANT CHANGE UP (ref 27–34)
MCHC RBC-ENTMCNC: 31.4 G/DL — LOW (ref 32–36)
MCV RBC AUTO: 99 FL — SIGNIFICANT CHANGE UP (ref 80–100)
MONOCYTES # BLD AUTO: 0.6 K/UL — SIGNIFICANT CHANGE UP (ref 0–0.9)
MONOCYTES NFR BLD AUTO: 1 % — LOW (ref 2–14)
NEUTROPHILS # BLD AUTO: 3 K/UL — SIGNIFICANT CHANGE UP (ref 1.8–7.4)
NEUTROPHILS NFR BLD AUTO: 5 % — LOW (ref 43–77)
NRBC # BLD: 0 /100 WBCS — SIGNIFICANT CHANGE UP (ref 0–0)
NRBC # BLD: SIGNIFICANT CHANGE UP /100 WBCS (ref 0–0)
NRBC BLD-RTO: 0 /100 WBCS — SIGNIFICANT CHANGE UP (ref 0–0)
NRBC BLD-RTO: SIGNIFICANT CHANGE UP /100 WBCS (ref 0–0)
PLAT MORPH BLD: NORMAL — SIGNIFICANT CHANGE UP
PLATELET # BLD AUTO: 239 K/UL — SIGNIFICANT CHANGE UP (ref 150–400)
POTASSIUM SERPL-SCNC: 4.8 MMOL/L
PROT SERPL-MCNC: 5.8 G/DL
RBC # BLD: 4.02 M/UL — SIGNIFICANT CHANGE UP (ref 3.8–5.2)
RBC # FLD: 13.1 % — SIGNIFICANT CHANGE UP (ref 10.3–14.5)
RBC BLD AUTO: SIGNIFICANT CHANGE UP
SMUDGE CELLS # BLD: PRESENT — SIGNIFICANT CHANGE UP
SODIUM SERPL-SCNC: 140 MMOL/L
URATE SERPL-MCNC: 3.4 MG/DL
WBC # BLD: 59.94 K/UL — CRITICAL HIGH (ref 3.8–10.5)
WBC # FLD AUTO: 59.94 K/UL — CRITICAL HIGH (ref 3.8–10.5)

## 2024-09-25 PROCEDURE — 99214 OFFICE O/P EST MOD 30 MIN: CPT

## 2024-09-25 NOTE — HISTORY OF PRESENT ILLNESS
[de-identified] : Ms. Gray is a 74 year old female with CLL (diagnosed 2008), treatment-naive, who presents for routine follow-up.  She feels well and has no complaints. She continues to maintain an active lifestyle. She is an avid runner and swimmer (swims almost daily). SHe recently competed in a swimming event in [place]. . She had COVID in September 2023 but no infections since.  States that  recently had covid and she did not get it.  She has no fevers/chills. No drenching night sweats. No CP. No SOB. Appetite is good. Weight stable. No new or worsening lymphadenopathy. No abd pain. No GI/ issues. No recent/recurrent infections. No new meds. She is enjoying nursing home.  She is UTD on all her age appropriate screenings  [de-identified] : CLL Markers:\par  mutated\par  CD38-, ZAP70-\par  del 13q [FreeTextEntry1] : Treatment naive [ECOG Performance Status: 0 - Fully active, able to carry on all pre-disease performance without restriction] : Performance Status: 0 - Fully active, able to carry on all pre-disease performance without restriction

## 2024-09-25 NOTE — PHYSICAL EXAM
[Fully active, able to carry on all pre-disease performance without restriction] : Status 0 - Fully active, able to carry on all pre-disease performance without restriction [Normal] : affect appropriate [de-identified] : AAOX3

## 2024-09-25 NOTE — ASSESSMENT
[FreeTextEntry1] : Ms. Gray is a 74 year old female with CLL (diagnosed 2008), treatment-naive, who presents for routine follow-up.  Plan: #CLL  -CBC reviewed, WBC 59.94 HGB 12.5  ANC 3.00 ALC 52.75 -CMP, LDH, Immunoglobulin and Uric acid sent today. Will f/u results.  -No indication for disease directed treatment. Will continue to monitor -Recommend continued COVID boosters, Annual Flu shots  -# routine health maintenance  -Continue age appropriate screening as indicated.  Shingles, Pneumonia vaccinations are up to date   RTO in 6 months, sooner PRN

## 2025-03-20 ENCOUNTER — OUTPATIENT (OUTPATIENT)
Dept: OUTPATIENT SERVICES | Facility: HOSPITAL | Age: 75
LOS: 1 days | Discharge: HOME | End: 2025-03-20

## 2025-03-20 DIAGNOSIS — D64.9 ANEMIA, UNSPECIFIED: ICD-10-CM

## 2025-03-21 ENCOUNTER — RESULT REVIEW (OUTPATIENT)
Age: 75
End: 2025-03-21

## 2025-03-21 ENCOUNTER — APPOINTMENT (OUTPATIENT)
Dept: HEMATOLOGY ONCOLOGY | Facility: CLINIC | Age: 75
End: 2025-03-21
Payer: MEDICARE

## 2025-03-21 ENCOUNTER — NON-APPOINTMENT (OUTPATIENT)
Age: 75
End: 2025-03-21

## 2025-03-21 VITALS
DIASTOLIC BLOOD PRESSURE: 80 MMHG | RESPIRATION RATE: 16 BRPM | HEART RATE: 67 BPM | OXYGEN SATURATION: 98 % | TEMPERATURE: 97.9 F | WEIGHT: 123.9 LBS | SYSTOLIC BLOOD PRESSURE: 138 MMHG | BODY MASS INDEX: 19.75 KG/M2

## 2025-03-21 LAB
ALBUMIN SERPL ELPH-MCNC: 4.5 G/DL
ALP BLD-CCNC: 81 U/L
ALT SERPL-CCNC: 16 U/L
ANION GAP SERPL CALC-SCNC: 11 MMOL/L
AST SERPL-CCNC: 20 U/L
BASOPHILS # BLD AUTO: 0 K/UL — SIGNIFICANT CHANGE UP (ref 0–0.2)
BASOPHILS NFR BLD AUTO: 0 % — SIGNIFICANT CHANGE UP (ref 0–2)
BILIRUB SERPL-MCNC: 0.5 MG/DL
BUN SERPL-MCNC: 15 MG/DL
CALCIUM SERPL-MCNC: 9.8 MG/DL
CHLORIDE SERPL-SCNC: 104 MMOL/L
CO2 SERPL-SCNC: 27 MMOL/L
CREAT SERPL-MCNC: 0.75 MG/DL
EGFRCR SERPLBLD CKD-EPI 2021: 84 ML/MIN/1.73M2
EOSINOPHIL # BLD AUTO: 0.2 K/UL — SIGNIFICANT CHANGE UP (ref 0–0.5)
EOSINOPHIL NFR BLD AUTO: 0.5 % — SIGNIFICANT CHANGE UP (ref 0–6)
GLUCOSE SERPL-MCNC: 104 MG/DL
HCT VFR BLD CALC: 39 % — SIGNIFICANT CHANGE UP (ref 34.5–45)
HGB BLD-MCNC: 12.5 G/DL — SIGNIFICANT CHANGE UP (ref 11.5–15.5)
LDH SERPL-CCNC: 183 U/L
LYMPHOCYTES # BLD AUTO: 31.27 K/UL — HIGH (ref 1–3.3)
LYMPHOCYTES # BLD AUTO: 76.5 % — HIGH (ref 13–44)
LYMPHOCYTES # SPEC AUTO: 9.5 % — HIGH (ref 0–0)
MCHC RBC-ENTMCNC: 30.7 PG — SIGNIFICANT CHANGE UP (ref 27–34)
MCHC RBC-ENTMCNC: 32.1 G/DL — SIGNIFICANT CHANGE UP (ref 32–36)
MCV RBC AUTO: 95.8 FL — SIGNIFICANT CHANGE UP (ref 80–100)
MONOCYTES # BLD AUTO: 0.61 K/UL — SIGNIFICANT CHANGE UP (ref 0–0.9)
MONOCYTES NFR BLD AUTO: 1.5 % — LOW (ref 2–14)
NEUTROPHILS # BLD AUTO: 4.91 K/UL — SIGNIFICANT CHANGE UP (ref 1.8–7.4)
NEUTROPHILS NFR BLD AUTO: 12 % — LOW (ref 43–77)
NRBC # BLD: 0 /100 WBCS — SIGNIFICANT CHANGE UP (ref 0–0)
NRBC BLD AUTO-RTO: SIGNIFICANT CHANGE UP /100 WBCS (ref 0–0)
NRBC BLD-RTO: 0 /100 WBCS — SIGNIFICANT CHANGE UP (ref 0–0)
PLAT MORPH BLD: NORMAL — SIGNIFICANT CHANGE UP
PLATELET # BLD AUTO: 241 K/UL — SIGNIFICANT CHANGE UP (ref 150–400)
POTASSIUM SERPL-SCNC: 4.5 MMOL/L
PROT SERPL-MCNC: 6.1 G/DL
RBC # BLD: 4.07 M/UL — SIGNIFICANT CHANGE UP (ref 3.8–5.2)
RBC # FLD: 13.1 % — SIGNIFICANT CHANGE UP (ref 10.3–14.5)
RBC BLD AUTO: SIGNIFICANT CHANGE UP
SMUDGE CELLS # BLD: PRESENT — SIGNIFICANT CHANGE UP
SODIUM SERPL-SCNC: 142 MMOL/L
WBC # BLD: 40.88 K/UL — CRITICAL HIGH (ref 3.8–10.5)
WBC # FLD AUTO: 40.88 K/UL — CRITICAL HIGH (ref 3.8–10.5)

## 2025-03-21 PROCEDURE — 99214 OFFICE O/P EST MOD 30 MIN: CPT

## 2025-03-25 LAB
DEPRECATED KAPPA LC FREE/LAMBDA SER: 1.32 RATIO
IGA SER QL IEP: 69 MG/DL
IGG SER QL IEP: 270 MG/DL
IGM SER QL IEP: 28 MG/DL
KAPPA LC CSF-MCNC: 0.6 MG/DL
KAPPA LC SERPL-MCNC: 0.79 MG/DL

## 2025-06-26 ENCOUNTER — NON-APPOINTMENT (OUTPATIENT)
Age: 75
End: 2025-06-26

## 2025-09-19 ENCOUNTER — APPOINTMENT (OUTPATIENT)
Dept: HEMATOLOGY ONCOLOGY | Facility: CLINIC | Age: 75
End: 2025-09-19